# Patient Record
Sex: MALE | Race: WHITE | Employment: OTHER | URBAN - METROPOLITAN AREA
[De-identification: names, ages, dates, MRNs, and addresses within clinical notes are randomized per-mention and may not be internally consistent; named-entity substitution may affect disease eponyms.]

---

## 2017-08-24 RX ORDER — FLUTICASONE PROPIONATE AND SALMETEROL 50; 250 UG/1; UG/1
POWDER RESPIRATORY (INHALATION)
Qty: 1 INHALER | Refills: 5 | Status: SHIPPED | OUTPATIENT
Start: 2017-08-24 | End: 2018-03-26 | Stop reason: ALTCHOICE

## 2018-03-26 ENCOUNTER — HOSPITAL ENCOUNTER (OUTPATIENT)
Dept: LAB | Age: 73
Discharge: HOME | End: 2018-03-26
Payer: MEDICARE

## 2018-03-26 ENCOUNTER — OFFICE VISIT (OUTPATIENT)
Dept: INTERNAL MEDICINE CLINIC | Age: 73
End: 2018-03-26

## 2018-03-26 VITALS
RESPIRATION RATE: 12 BRPM | DIASTOLIC BLOOD PRESSURE: 76 MMHG | WEIGHT: 155 LBS | BODY MASS INDEX: 22.19 KG/M2 | HEIGHT: 70 IN | HEART RATE: 68 BPM | SYSTOLIC BLOOD PRESSURE: 125 MMHG | TEMPERATURE: 98.3 F

## 2018-03-26 DIAGNOSIS — J44.9 CHRONIC OBSTRUCTIVE PULMONARY DISEASE, UNSPECIFIED COPD TYPE (HCC): ICD-10-CM

## 2018-03-26 DIAGNOSIS — Z00.00 MEDICARE ANNUAL WELLNESS VISIT, SUBSEQUENT: Primary | ICD-10-CM

## 2018-03-26 DIAGNOSIS — F17.200 TOBACCO USE DISORDER: ICD-10-CM

## 2018-03-26 DIAGNOSIS — Z12.11 ENCOUNTER FOR SCREENING FECAL OCCULT BLOOD TESTING: ICD-10-CM

## 2018-03-26 DIAGNOSIS — F03.A0 MILD DEMENTIA: ICD-10-CM

## 2018-03-26 DIAGNOSIS — Z71.89 ACP (ADVANCE CARE PLANNING): ICD-10-CM

## 2018-03-26 PROCEDURE — 36415 COLL VENOUS BLD VENIPUNCTURE: CPT

## 2018-03-26 PROCEDURE — 80053 COMPREHEN METABOLIC PANEL: CPT

## 2018-03-26 PROCEDURE — 84153 ASSAY OF PSA TOTAL: CPT

## 2018-03-26 PROCEDURE — 80061 LIPID PANEL: CPT

## 2018-03-26 PROCEDURE — 85027 COMPLETE CBC AUTOMATED: CPT

## 2018-03-26 RX ORDER — MEMANTINE HYDROCHLORIDE AND DONEPEZIL HYDROCHLORIDE 28; 10 MG/1; MG/1
1 CAPSULE ORAL DAILY
Refills: 0 | COMMUNITY
Start: 2018-02-26 | End: 2019-01-01 | Stop reason: ALTCHOICE

## 2018-03-26 RX ORDER — TIMOLOL MALEATE 5 MG/ML
1 SOLUTION/ DROPS OPHTHALMIC DAILY
Refills: 0 | COMMUNITY
Start: 2020-01-01

## 2018-03-26 NOTE — ACP (ADVANCE CARE PLANNING)
Advance Care Planning (ACP) Provider Note - Comprehensive     Date of ACP Conversation: 03/26/18  Persons included in Conversation:  patient  Length of ACP Conversation in minutes:  <16 minutes (Non-Billable)    Authorized Decision Maker (if patient is incapable of making informed decisions): This person is:  Healthcare Agent/Medical Power of  under Advance Directive          General ACP for ALL Patients with Decision Making Capacity:   Importance of advance care planning, including choosing a healthcare agent to communicate patient's healthcare decisions if patient lost the ability to make decisions, such as after a sudden illness or accident  Understanding of the healthcare agent role was assessed and information provided  Exploration of values, goals, and preferences if recovery is not expected, even with continued medical treatment in the event of: Imminent death  Severe, permanent brain injury    Review of Existing Advance Directive:  not availble     For Serious or Chronic Illness:  Understanding of medical condition    Understanding of CPR, goals and expected outcomes, benefits and burdens discussed. Information on CPR success rates provided (e.g. for CPR in hospital, survival to d/c at two weeks is 22%, for chronically ill or elderly/frail survival is less than 3%); Individual asked to communicate understanding of information in his/her own words.   Explored fears and concerns regarding CPR or possible outcomes    Interventions Provided:  Had Advanced Directive at home  Recommended communicating the plan and making copies for the healthcare agent, personal physician, and others as appropriate (e.g., health system)

## 2018-03-26 NOTE — MR AVS SNAPSHOT
[facility] 
 
 
 [address] [phone] Patient: Mindy  MRN: [id number] Visit Information Date & Time Provider Department Dept. Phone Encounter #  
 3/26/2018 10:00 AM Kieran Lemons MD [facility] [address] [id number] Upcoming Health Maintenance Date Due FOBT Q 1 YEAR AGE 50-75 2018* MEDICARE YEARLY EXAM 3/27/2019 GLAUCOMA SCREENING Q2Y 2019 DTaP/Tdap/Td series (2 - Td) 2026 *Topic was postponed. The date shown is not the original due date. Allergies as of 3/26/2018  Review Complete On: 3/26/2018 By: Kieran Lemons MD  
 No Known Allergies Current Immunizations  Reviewed on 3/26/2018 Name Date Pneumococcal Conjugate (PCV-13) 2016 Pneumococcal Polysaccharide (PPSV-23) 10/10/2017 Reviewed by Sofya Staton on 3/26/2018 at 10:07 AM  
 Reviewed by Kieran Lemons MD on 3/26/2018 at 10:23 AM  
You Were Diagnosed With   
  
 Codes Comments Medicare annual wellness visit, subsequent    -  Primary ICD-10-CM: Z00.00 ICD-9-CM: V70.0 ACP (advance care planning)     ICD-10-CM: Z71.89 ICD-9-CM: V65.49 Encounter for screening fecal occult blood testing     ICD-10-CM: Z12.11 ICD-9-CM: V76.51 Chronic obstructive pulmonary disease, unspecified COPD type ([facility]     ICD-10-CM: J44.9 ICD-9-CM: 122 Mild dementia     ICD-10-CM: F03.90 ICD-9-CM: 294.20 Tobacco use disorder     ICD-10-CM: F17.200 ICD-9-CM: 305.1 Vitals BP Pulse Temp Resp Height(growth percentile) Weight(growth percentile) 125/76 (BP 1 Location: Left arm, BP Patient Position: Sitting) 68 98.3 °F (36.8 °C) (Oral) 12 5' 9.5\" (1.765 m) 155 lb (70.3 kg) BMI Smoking Status 22.56 kg/m2 Current Every Day Smoker Vitals History BMI and BSA Data  Body Mass Index Body Surface Area  
 22.56 kg/m 2 1.86 m 2  
  
  
 Preferred Pharmacy Pharm[facility] Name Phone [facility] [address] [phone] Your Updated Medication List  
  
   
This list is accurate as of 3/26/18 10:37 AM.  Always use your most recent med list.  
  
  
  
  
 LUMIGAN 0.01 % ophthalmic drops Generic drug:  bimatoprost  
Administer 1 Drop to both eyes every evening. NAMZARIC 28-10 mg Cspx Generic drug:  memantine-donepezil  
  
 timolol 0.5 % ophthalmic solution Commonly known as:  TIMOPTIC We Performed the Following CBC W/O DIFF [89047 CPT(R)] LIPID PANEL [78869 CPT(R)] METABOLIC PANEL, COMPREHENSIVE [93506 CPT(R)] OCCULT BLOOD, IMMUNOASSAY (FIT) [id number] CPT(R)] PSA W/ REFLX FREE PSA [45518 CPT(R)] Introducing [facility]! Dear Landen Stephen: Thank you for requesting a [email or web address] account. Our records indicate that you have previously registered for a [email or web address] account but its currently inactive. Please call our [email or web address] support line at [phone]. Additional Information If you have questions, please visit the Frequently Asked Questions section of the [email or web address] website at [email or web address] Remember, [email or web address] is NOT to be used for urgent needs. For medical emergencies, dial 911. Now available from your iPhone and Android! Please provide this summary of care documentation to your next provider. Your primary care clinician is listed as Umesh Trejo. If you have any questions after today's visit, please call [phone].

## 2018-03-27 LAB
ALBUMIN SERPL-MCNC: 4.4 G/DL (ref 3.5–4.8)
ALBUMIN/GLOB SERPL: 1.6 {RATIO} (ref 1.2–2.2)
ALP SERPL-CCNC: 118 IU/L (ref 39–117)
ALT SERPL-CCNC: 10 IU/L (ref 0–44)
AST SERPL-CCNC: 17 IU/L (ref 0–40)
BILIRUB SERPL-MCNC: 0.5 MG/DL (ref 0–1.2)
BUN SERPL-MCNC: 14 MG/DL (ref 8–27)
BUN/CREAT SERPL: 16 (ref 10–24)
CALCIUM SERPL-MCNC: 9.8 MG/DL (ref 8.6–10.2)
CHLORIDE SERPL-SCNC: 99 MMOL/L (ref 96–106)
CHOLEST SERPL-MCNC: 186 MG/DL (ref 100–199)
CO2 SERPL-SCNC: 26 MMOL/L (ref 18–29)
CREAT SERPL-MCNC: 0.85 MG/DL (ref 0.76–1.27)
ERYTHROCYTE [DISTWIDTH] IN BLOOD BY AUTOMATED COUNT: 13.5 % (ref 12.3–15.4)
GFR SERPLBLD CREATININE-BSD FMLA CKD-EPI: 100 ML/MIN/1.73
GFR SERPLBLD CREATININE-BSD FMLA CKD-EPI: 86 ML/MIN/1.73
GLOBULIN SER CALC-MCNC: 2.7 G/DL (ref 1.5–4.5)
GLUCOSE SERPL-MCNC: 85 MG/DL (ref 65–99)
HCT VFR BLD AUTO: 41.7 % (ref 37.5–51)
HDLC SERPL-MCNC: 43 MG/DL
HGB BLD-MCNC: 13.7 G/DL (ref 13–17.7)
INTERPRETATION, 910389: NORMAL
LDLC SERPL CALC-MCNC: 125 MG/DL (ref 0–99)
MCH RBC QN AUTO: 33.3 PG (ref 26.6–33)
MCHC RBC AUTO-ENTMCNC: 32.9 G/DL (ref 31.5–35.7)
MCV RBC AUTO: 102 FL (ref 79–97)
PLATELET # BLD AUTO: 330 X10E3/UL (ref 150–379)
POTASSIUM SERPL-SCNC: 4.4 MMOL/L (ref 3.5–5.2)
PROT SERPL-MCNC: 7.1 G/DL (ref 6–8.5)
PSA SERPL-MCNC: 0.6 NG/ML (ref 0–4)
RBC # BLD AUTO: 4.11 X10E6/UL (ref 4.14–5.8)
REFLEX CRITERIA: NORMAL
SODIUM SERPL-SCNC: 140 MMOL/L (ref 134–144)
TRIGL SERPL-MCNC: 91 MG/DL (ref 0–149)
VLDLC SERPL CALC-MCNC: 18 MG/DL (ref 5–40)
WBC # BLD AUTO: 7.6 X10E3/UL (ref 3.4–10.8)
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
## 2018-04-13 ENCOUNTER — HOSPITAL ENCOUNTER (OUTPATIENT)
Dept: LAB | Age: 73
Discharge: HOME | End: 2018-04-13
Payer: MEDICARE

## 2018-04-13 PROCEDURE — 82270 OCCULT BLOOD FECES: CPT

## 2018-04-19 LAB — HEMOCCULT STL QL IA: NEGATIVE

## 2018-07-23 ENCOUNTER — OFFICE VISIT (OUTPATIENT)
Dept: INTERNAL MEDICINE CLINIC | Age: 73
End: 2018-07-23

## 2018-07-23 VITALS
SYSTOLIC BLOOD PRESSURE: 135 MMHG | OXYGEN SATURATION: 96 % | DIASTOLIC BLOOD PRESSURE: 73 MMHG | TEMPERATURE: 97.8 F | BODY MASS INDEX: 21.92 KG/M2 | RESPIRATION RATE: 18 BRPM | HEIGHT: 69 IN | WEIGHT: 148 LBS | HEART RATE: 55 BPM

## 2018-07-23 DIAGNOSIS — R55 PRE-SYNCOPE: Primary | ICD-10-CM
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
## 2018-07-23 NOTE — MR AVS SNAPSHOT
[facility] 
 
 
 [address] [place] [address] [facility] 
[phone] Patient: Tiffany Farris MRN: [id number] Visit Information Date & Time Provider Department Dept. Phone Encounter #  
 7/23/2018 10:30 AM Christina Newsome MD [facility] [address] [facility] [id number] Upcoming Health Maintenance Date Due Influenza Age 5 to Adult 8/1/2018 MEDICARE YEARLY EXAM 3/27/2019 FOBT Q 1 YEAR AGE 50-75 4/13/2019 GLAUCOMA SCREENING Q2Y 4/16/2020 DTaP/Tdap/Td series (2 - Td) 5/12/2026 Allergies as of 7/23/2018  Review Complete On: 7/23/2018 By: Christina Newsome MD  
 No Known Allergies Current Immunizations  Reviewed on 3/26/2018 Name Date Pneumococcal Conjugate (PCV-13) 8/2/2016 Pneumococcal Polysaccharide (PPSV-23) 10/10/2017 Not reviewed this visit You Were Diagnosed With   
  
 Codes Comments Pre-syncope    -  Primary ICD-10-CM: R55 
ICD-9-CM: 067. 2 Vitals BP Pulse Temp Resp Height(growth percentile) Weight(growth percentile) 135/73 (BP 1 Location: Left arm, BP Patient Position: Sitting) (!) 55 97.8 °F (36.6 °C) (Oral) 18 5' 9\" (1.753 m) 148 lb (67.1 kg) SpO2 BMI Smoking Status 96% 21.86 kg/m2 Current Every Day Smoker Vitals History BMI and BSA Data Body Mass Index Body Surface Area  
 21.86 kg/m 2 1.81 m 2 Preferred Pharmacy Pharmacy Name Phone [facility] [address] [facility] [phone] Your Updated Medication List  
  
   
This list is accurate as of 7/23/18 11:21 AM.  Always use your most recent med list.  
  
  
  
  
 LUMIGAN 0.01 % ophthalmic drops Generic drug:  bimatoprost  
Administer 1 Drop to right eye every evening. MULTIVITAMIN & MINERAL FORMULA PO Take 1 Tab by mouth daily. NAMZARIC 28-10 mg Cspx Generic drug:  memantine-donepezil Take 1 Tab by mouth daily. timolol 0.5 % ophthalmic solution Commonly known as:  TIMOPTIC Administer 1 Drop to right eye daily. Introducing [facility]! Dear Shannon Alvarado: Thank you for requesting a [employer] account. Our records indicate that you have previously registered for a [employer] account but its currently inactive. Please call our [employer] support line at [phone]. Additional Information If you have questions, please visit the Frequently Asked Questions section of the [employer] website at [email or web address] Remember, [employer] is NOT to be used for urgent needs. For medical emergencies, dial 911. Now available from your iPhone and Android! Please provide this summary of care documentation to your next provider. Your primary care clinician is listed as Collie Link. If you have any questions after today's visit, please call [phone].

## 2019-01-01 ENCOUNTER — HOSPITAL ENCOUNTER (OUTPATIENT)
Dept: LAB | Age: 74
Discharge: HOME | End: 2019-11-11

## 2019-01-01 ENCOUNTER — OFFICE VISIT (OUTPATIENT)
Dept: INTERNAL MEDICINE CLINIC | Age: 74
End: 2019-01-01

## 2019-01-01 VITALS
BODY MASS INDEX: 21.42 KG/M2 | TEMPERATURE: 97.6 F | WEIGHT: 144 LBS | RESPIRATION RATE: 18 BRPM | HEART RATE: 63 BPM | SYSTOLIC BLOOD PRESSURE: 113 MMHG | HEART RATE: 92 BPM | DIASTOLIC BLOOD PRESSURE: 73 MMHG | SYSTOLIC BLOOD PRESSURE: 122 MMHG | BODY MASS INDEX: 21.33 KG/M2 | TEMPERATURE: 97.8 F | OXYGEN SATURATION: 95 % | HEIGHT: 69 IN | DIASTOLIC BLOOD PRESSURE: 73 MMHG | RESPIRATION RATE: 12 BRPM | OXYGEN SATURATION: 97 % | HEIGHT: 69 IN | WEIGHT: 144.6 LBS

## 2019-01-01 DIAGNOSIS — Z00.00 MEDICARE ANNUAL WELLNESS VISIT, SUBSEQUENT: Primary | ICD-10-CM

## 2019-01-01 DIAGNOSIS — H61.23 BILATERAL HEARING LOSS DUE TO CERUMEN IMPACTION: ICD-10-CM

## 2019-01-01 DIAGNOSIS — J44.9 CHRONIC OBSTRUCTIVE PULMONARY DISEASE, UNSPECIFIED COPD TYPE (HCC): ICD-10-CM

## 2019-01-01 DIAGNOSIS — Z28.21 INFLUENZA VACCINATION DECLINED: ICD-10-CM

## 2019-01-01 DIAGNOSIS — F03.A0 MILD DEMENTIA: ICD-10-CM

## 2019-01-01 DIAGNOSIS — Z00.00 MEDICARE ANNUAL WELLNESS VISIT, SUBSEQUENT: ICD-10-CM

## 2019-01-01 DIAGNOSIS — H61.23 BILATERAL HEARING LOSS DUE TO CERUMEN IMPACTION: Primary | ICD-10-CM

## 2019-01-01 LAB
ALBUMIN SERPL-MCNC: 4 G/DL (ref 3.5–5)
ALBUMIN/GLOB SERPL: 1.3 {RATIO} (ref 1.1–2.2)
ALP SERPL-CCNC: 142 U/L (ref 45–117)
ALT SERPL-CCNC: 15 U/L (ref 12–78)
ANION GAP SERPL CALC-SCNC: 6 MMOL/L (ref 5–15)
AST SERPL-CCNC: 11 U/L (ref 15–37)
BILIRUB SERPL-MCNC: 0.3 MG/DL (ref 0.2–1)
BUN SERPL-MCNC: 11 MG/DL (ref 6–20)
BUN/CREAT SERPL: 13 (ref 12–20)
CALCIUM SERPL-MCNC: 9.4 MG/DL (ref 8.5–10.1)
CHLORIDE SERPL-SCNC: 103 MMOL/L (ref 97–108)
CHOLEST SERPL-MCNC: 189 MG/DL
CO2 SERPL-SCNC: 29 MMOL/L (ref 21–32)
CREAT SERPL-MCNC: 0.87 MG/DL (ref 0.7–1.3)
ERYTHROCYTE [DISTWIDTH] IN BLOOD BY AUTOMATED COUNT: 13.1 % (ref 11.5–14.5)
GLOBULIN SER CALC-MCNC: 3 G/DL (ref 2–4)
GLUCOSE SERPL-MCNC: 99 MG/DL (ref 65–100)
HCT VFR BLD AUTO: 40.1 % (ref 36.6–50.3)
HDLC SERPL-MCNC: 41 MG/DL
HDLC SERPL: 4.6 {RATIO} (ref 0–5)
HGB BLD-MCNC: 13.2 G/DL (ref 12.1–17)
LDLC SERPL CALC-MCNC: 130.8 MG/DL (ref 0–100)
LIPID PROFILE,FLP: ABNORMAL
MCH RBC QN AUTO: 33.9 PG (ref 26–34)
MCHC RBC AUTO-ENTMCNC: 32.9 G/DL (ref 30–36.5)
MCV RBC AUTO: 103.1 FL (ref 80–99)
NRBC # BLD: 0 K/UL (ref 0–0.01)
NRBC BLD-RTO: 0 PER 100 WBC
PLATELET # BLD AUTO: 291 K/UL (ref 150–400)
PMV BLD AUTO: 10 FL (ref 8.9–12.9)
POTASSIUM SERPL-SCNC: 3.7 MMOL/L (ref 3.5–5.1)
PROT SERPL-MCNC: 7 G/DL (ref 6.4–8.2)
RBC # BLD AUTO: 3.89 M/UL (ref 4.1–5.7)
SODIUM SERPL-SCNC: 138 MMOL/L (ref 136–145)
TRIGL SERPL-MCNC: 86 MG/DL (ref ?–150)
VLDLC SERPL CALC-MCNC: 17.2 MG/DL
WBC # BLD AUTO: 10.1 K/UL (ref 4.1–11.1)

## 2019-01-01 RX ORDER — MEMANTINE HYDROCHLORIDE 10 MG/1
10 TABLET ORAL 2 TIMES DAILY
Qty: 60 TAB | Refills: 5 | Status: SHIPPED | OUTPATIENT
Start: 2019-01-01 | End: 2019-01-01 | Stop reason: ALTCHOICE

## 2019-01-01 RX ORDER — DONEPEZIL HYDROCHLORIDE 10 MG/1
10 TABLET, FILM COATED ORAL
Qty: 30 TAB | Refills: 5 | Status: SHIPPED | OUTPATIENT
Start: 2019-01-01 | End: 2019-01-01 | Stop reason: ALTCHOICE

## 2019-01-01 RX ORDER — MEMANTINE HYDROCHLORIDE AND DONEPEZIL HYDROCHLORIDE 28; 10 MG/1; MG/1
1 CAPSULE ORAL DAILY
Qty: 30 CAP | Refills: 5 | Status: SHIPPED | OUTPATIENT
Start: 2019-01-01

## 2019-01-01 RX ORDER — MEMANTINE HYDROCHLORIDE AND DONEPEZIL HYDROCHLORIDE 28; 10 MG/1; MG/1
1 CAPSULE ORAL DAILY
Qty: 30 CAP | Refills: 5
Start: 2019-01-01 | End: 2019-01-01 | Stop reason: SDUPTHER

## 2019-11-11 NOTE — ACP (ADVANCE CARE PLANNING)
Advance Care Planning (ACP) Note Date of ACP Conversation: 11/11/2019 Persons included in Conversation: patient Length of ACP Conversation in minutes: <16 minutes (Non-Billable) Conversation requested by: 
 Patient Authorized Decision Maker (if patient is incapable of making informed decisions): This person is: 
Healthcare Agent/Medical Power of  under Advance Directive General ACP for ALL Patients with Decision Making Capacity: yes Advance Directive Conversation with Patients who have not yet planned: 
Importance of advance care planning, including choosing a healthcare agent to communicate patient's healthcare decisions if patient lost the ability to make decisions, such as after a sudden illness or accident Explored patient's values, goals, and care preferences as related to these situations Review of Existing Advance Directive: (Select questions covered) Does this advance directive still reflect your preferences? Yes Interventions Provided: 
Recommended review of completed ACP document annually or upon change in health status

## 2019-11-11 NOTE — PATIENT INSTRUCTIONS
Well Visit, Over 72: Care Instructions Your Care Instructions Physical exams can help you stay healthy. Your doctor has checked your overall health and may have suggested ways to take good care of yourself. He or she also may have recommended tests. At home, you can help prevent illness with healthy eating, regular exercise, and other steps. Follow-up care is a key part of your treatment and safety. Be sure to make and go to all appointments, and call your doctor if you are having problems. It's also a good idea to know your test results and keep a list of the medicines you take. How can you care for yourself at home? · Reach and stay at a healthy weight. This will lower your risk for many problems, such as obesity, diabetes, heart disease, and high blood pressure. · Get at least 30 minutes of exercise on most days of the week. Walking is a good choice. You also may want to do other activities, such as running, swimming, cycling, or playing tennis or team sports. · Do not smoke. Smoking can make health problems worse. If you need help quitting, talk to your doctor about stop-smoking programs and medicines. These can increase your chances of quitting for good. · Protect your skin from too much sun. When you're outdoors from 10 a.m. to 4 p.m., stay in the shade or cover up with clothing and a hat with a wide brim. Wear sunglasses that block UV rays. Even when it's cloudy, put broad-spectrum sunscreen (SPF 30 or higher) on any exposed skin. · See a dentist one or two times a year for checkups and to have your teeth cleaned. · Wear a seat belt in the car. Follow your doctor's advice about when to have certain tests. These tests can spot problems early. For men and women · Cholesterol. Your doctor will tell you how often to have this done based on your overall health and other things that can increase your risk for heart attack and stroke. · Blood pressure. Have your blood pressure checked during a routine doctor visit. Your doctor will tell you how often to check your blood pressure based on your age, your blood pressure results, and other factors. · Diabetes. Ask your doctor whether you should have tests for diabetes. · Vision. Experts recommend that you have yearly exams for glaucoma and other age-related eye problems. · Hearing. Tell your doctor if you notice any change in your hearing. You can have tests to find out how well you hear. · Colon cancer tests. Keep having colon cancer tests as your doctor recommends. You can have one of several types of tests. · Heart attack and stroke risk. At least every 4 to 6 years, you should have your risk for heart attack and stroke assessed. Your doctor uses factors such as your age, blood pressure, cholesterol, and whether you smoke or have diabetes to show what your risk for a heart attack or stroke is over the next 10 years. · Osteoporosis. Talk to your doctor about whether you should have a bone density test to find out whether you have thinning bones. Also ask your doctor about whether you should take calcium and vitamin D supplements. For women · Pap test and pelvic exam. You may no longer need a Pap test. Talk with your doctor about whether to stop or continue to have Pap tests. · Breast exam and mammogram. Ask how often you should have a mammogram, which is an X-ray of your breasts. A mammogram can spot breast cancer before it can be felt and when it is easiest to treat. · Thyroid disease. Talk to your doctor about whether to have your thyroid checked as part of a regular physical exam. Women have an increased chance of a thyroid problem. For men · Prostate exam. Talk to your doctor about whether you should have a blood test (called a PSA test) for prostate cancer.  Experts recommend that you discuss the benefits and risks of the test with your doctor before you decide whether to have this test. Some experts say that men ages 79 and older no longer need testing. · Abdominal aortic aneurysm. Ask your doctor whether you should have a test to check for an aneurysm. You may need a test if you ever smoked or if your parent, brother, sister, or child has had an aneurysm. When should you call for help? Watch closely for changes in your health, and be sure to contact your doctor if you have any problems or symptoms that concern you. Where can you learn more? Go to http://gee-nigel.info/. Enter Z025 in the search box to learn more about \"Well Visit, Over 65: Care Instructions. \" Current as of: December 13, 2018 Content Version: 12.2 © 8452-6856 [employer]. Care instructions adapted under license by [employer] (which disclaims liability or warranty for this information). If you have questions about a medical condition or this instruction, always ask your healthcare professional. [place] [address] any warranty or liability for your use of this information.
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
## 2020-01-01 ENCOUNTER — HOME CARE VISIT (OUTPATIENT)
Dept: HOSPICE | Facility: HOSPICE | Age: 75
End: 2020-01-01
Payer: MEDICARE

## 2020-01-01 ENCOUNTER — VIRTUAL VISIT (OUTPATIENT)
Dept: ONCOLOGY | Age: 75
End: 2020-01-01

## 2020-01-01 ENCOUNTER — HOME CARE VISIT (OUTPATIENT)
Dept: SCHEDULING | Facility: HOME HEALTH | Age: 75
End: 2020-01-01
Payer: MEDICARE

## 2020-01-01 ENCOUNTER — HOSPITAL ENCOUNTER (OUTPATIENT)
Dept: LAB | Age: 75
Discharge: HOME | End: 2020-04-09

## 2020-01-01 ENCOUNTER — TELEPHONE (OUTPATIENT)
Dept: INTERNAL MEDICINE CLINIC | Age: 75
End: 2020-01-01

## 2020-01-01 ENCOUNTER — HOSPITAL ENCOUNTER (OUTPATIENT)
Dept: LAB | Age: 75
Discharge: HOME | End: 2020-03-26

## 2020-01-01 ENCOUNTER — TELEPHONE (OUTPATIENT)
Dept: ONCOLOGY | Age: 75
End: 2020-01-01

## 2020-01-01 ENCOUNTER — OFFICE VISIT (OUTPATIENT)
Dept: INTERNAL MEDICINE CLINIC | Age: 75
End: 2020-01-01

## 2020-01-01 ENCOUNTER — HOSPITAL ENCOUNTER (OUTPATIENT)
Dept: GENERAL RADIOLOGY | Age: 75
Discharge: HOME | End: 2020-04-09
Attending: INTERNAL MEDICINE
Payer: MEDICARE

## 2020-01-01 ENCOUNTER — HOSPITAL ENCOUNTER (OUTPATIENT)
Dept: CT IMAGING | Age: 75
Discharge: HOME | End: 2020-04-09
Attending: INTERNAL MEDICINE
Payer: MEDICARE

## 2020-01-01 ENCOUNTER — HOSPITAL ENCOUNTER (OUTPATIENT)
Dept: CT IMAGING | Age: 75
Discharge: HOME | End: 2020-04-14
Attending: INTERNAL MEDICINE
Payer: MEDICARE

## 2020-01-01 ENCOUNTER — HOSPICE ADMISSION (OUTPATIENT)
Dept: HOSPICE | Facility: HOSPICE | Age: 75
End: 2020-01-01
Payer: MEDICARE

## 2020-01-01 VITALS
SYSTOLIC BLOOD PRESSURE: 130 MMHG | DIASTOLIC BLOOD PRESSURE: 64 MMHG | TEMPERATURE: 97.4 F | OXYGEN SATURATION: 90 % | TEMPERATURE: 97.7 F | RESPIRATION RATE: 24 BRPM | HEART RATE: 92 BPM | SYSTOLIC BLOOD PRESSURE: 92 MMHG | OXYGEN SATURATION: 88 % | HEART RATE: 128 BPM | SYSTOLIC BLOOD PRESSURE: 106 MMHG | RESPIRATION RATE: 40 BRPM | HEART RATE: 118 BPM | DIASTOLIC BLOOD PRESSURE: 64 MMHG | DIASTOLIC BLOOD PRESSURE: 62 MMHG | OXYGEN SATURATION: 82 % | RESPIRATION RATE: 40 BRPM | TEMPERATURE: 97.8 F

## 2020-01-01 VITALS
DIASTOLIC BLOOD PRESSURE: 75 MMHG | OXYGEN SATURATION: 93 % | HEART RATE: 110 BPM | BODY MASS INDEX: 20.88 KG/M2 | RESPIRATION RATE: 18 BRPM | HEIGHT: 67 IN | WEIGHT: 133 LBS | SYSTOLIC BLOOD PRESSURE: 115 MMHG

## 2020-01-01 VITALS
WEIGHT: 133.8 LBS | TEMPERATURE: 96 F | RESPIRATION RATE: 16 BRPM | HEART RATE: 84 BPM | OXYGEN SATURATION: 94 % | SYSTOLIC BLOOD PRESSURE: 125 MMHG | HEIGHT: 69 IN | DIASTOLIC BLOOD PRESSURE: 78 MMHG | BODY MASS INDEX: 19.82 KG/M2

## 2020-01-01 VITALS — DIASTOLIC BLOOD PRESSURE: 62 MMHG | SYSTOLIC BLOOD PRESSURE: 88 MMHG | RESPIRATION RATE: 36 BRPM

## 2020-01-01 VITALS
OXYGEN SATURATION: 94 % | HEART RATE: 100 BPM | BODY MASS INDEX: 20.51 KG/M2 | RESPIRATION RATE: 16 BRPM | DIASTOLIC BLOOD PRESSURE: 76 MMHG | HEIGHT: 69 IN | SYSTOLIC BLOOD PRESSURE: 113 MMHG | WEIGHT: 138.5 LBS | TEMPERATURE: 97.3 F

## 2020-01-01 VITALS — HEART RATE: 110 BPM | RESPIRATION RATE: 20 BRPM | OXYGEN SATURATION: 88 %

## 2020-01-01 DIAGNOSIS — Z12.5 SCREENING PSA (PROSTATE SPECIFIC ANTIGEN): ICD-10-CM

## 2020-01-01 DIAGNOSIS — J90 PLEURAL EFFUSION ON RIGHT: ICD-10-CM

## 2020-01-01 DIAGNOSIS — M54.50 ACUTE RIGHT-SIDED LOW BACK PAIN WITHOUT SCIATICA: ICD-10-CM

## 2020-01-01 DIAGNOSIS — R82.998 LEUKOCYTES IN URINE: ICD-10-CM

## 2020-01-01 DIAGNOSIS — R63.4 WEIGHT LOSS: Primary | ICD-10-CM

## 2020-01-01 DIAGNOSIS — R07.89 RIGHT-SIDED CHEST WALL PAIN: ICD-10-CM

## 2020-01-01 DIAGNOSIS — C80.1 CANCER (HCC): Primary | ICD-10-CM

## 2020-01-01 DIAGNOSIS — I49.9 CARDIAC ARRHYTHMIA, UNSPECIFIED CARDIAC ARRHYTHMIA TYPE: ICD-10-CM

## 2020-01-01 DIAGNOSIS — R91.8 LUNG MASS: ICD-10-CM

## 2020-01-01 DIAGNOSIS — R63.0 DECREASED APPETITE: ICD-10-CM

## 2020-01-01 DIAGNOSIS — C78.00 MALIGNANT NEOPLASM METASTATIC TO LUNG, UNSPECIFIED LATERALITY (HCC): ICD-10-CM

## 2020-01-01 DIAGNOSIS — F03.B0 MODERATE DEMENTIA WITHOUT BEHAVIORAL DISTURBANCE: ICD-10-CM

## 2020-01-01 DIAGNOSIS — J44.9 CHRONIC OBSTRUCTIVE PULMONARY DISEASE, UNSPECIFIED COPD TYPE (HCC): ICD-10-CM

## 2020-01-01 DIAGNOSIS — R16.0 LIVER MASS: ICD-10-CM

## 2020-01-01 DIAGNOSIS — M54.50 ACUTE RIGHT-SIDED LOW BACK PAIN WITHOUT SCIATICA: Primary | ICD-10-CM

## 2020-01-01 DIAGNOSIS — C78.7 METASTASIS TO LIVER (HCC): ICD-10-CM

## 2020-01-01 DIAGNOSIS — J90 PLEURAL EFFUSION: ICD-10-CM

## 2020-01-01 DIAGNOSIS — I49.1 PAC (PREMATURE ATRIAL CONTRACTION): ICD-10-CM

## 2020-01-01 DIAGNOSIS — J90 PLEURAL EFFUSION: Primary | ICD-10-CM

## 2020-01-01 DIAGNOSIS — N13.30 HYDRONEPHROSIS, UNSPECIFIED HYDRONEPHROSIS TYPE: ICD-10-CM

## 2020-01-01 DIAGNOSIS — C80.1 CANCER (HCC): ICD-10-CM

## 2020-01-01 DIAGNOSIS — R63.4 WEIGHT LOSS: ICD-10-CM

## 2020-01-01 DIAGNOSIS — C34.91 MALIGNANT NEOPLASM OF RIGHT LUNG, UNSPECIFIED PART OF LUNG (HCC): Primary | ICD-10-CM

## 2020-01-01 LAB
ALBUMIN SERPL ELPH-MCNC: 3.3 G/DL (ref 2.9–4.4)
ALBUMIN SERPL-MCNC: 3.4 G/DL (ref 3.5–5)
ALBUMIN/GLOB SERPL: 0.9 {RATIO} (ref 0.7–1.7)
ALBUMIN/GLOB SERPL: 0.9 {RATIO} (ref 1.1–2.2)
ALP SERPL-CCNC: 156 U/L (ref 45–117)
ALPHA1 GLOB SERPL ELPH-MCNC: 0.4 G/DL (ref 0–0.4)
ALPHA2 GLOB SERPL ELPH-MCNC: 1 G/DL (ref 0.4–1)
ALT SERPL-CCNC: 46 U/L (ref 12–78)
ANION GAP SERPL CALC-SCNC: 13 MMOL/L (ref 5–15)
AST SERPL-CCNC: 63 U/L (ref 15–37)
B-GLOBULIN SERPL ELPH-MCNC: 1.4 G/DL (ref 0.7–1.3)
BACTERIA SPEC CULT: NORMAL
BASOPHILS # BLD: 0 K/UL (ref 0–0.1)
BASOPHILS NFR BLD: 0 % (ref 0–1)
BILIRUB SERPL-MCNC: 0.6 MG/DL (ref 0.2–1)
BILIRUB UR QL STRIP: ABNORMAL
BUN SERPL-MCNC: 14 MG/DL (ref 6–20)
BUN/CREAT SERPL: 13 (ref 12–20)
CALCIUM SERPL-MCNC: 10 MG/DL (ref 8.5–10.1)
CHLORIDE SERPL-SCNC: 95 MMOL/L (ref 97–108)
CO2 SERPL-SCNC: 27 MMOL/L (ref 21–32)
CREAT SERPL-MCNC: 1.08 MG/DL (ref 0.7–1.3)
DIFFERENTIAL METHOD BLD: ABNORMAL
EOSINOPHIL # BLD: 0 K/UL (ref 0–0.4)
EOSINOPHIL NFR BLD: 0 % (ref 0–7)
ERYTHROCYTE [DISTWIDTH] IN BLOOD BY AUTOMATED COUNT: 12.8 % (ref 11.5–14.5)
GAMMA GLOB SERPL ELPH-MCNC: 0.7 G/DL (ref 0.4–1.8)
GLOBULIN SER CALC-MCNC: 3.5 G/DL (ref 2.2–3.9)
GLOBULIN SER CALC-MCNC: 3.8 G/DL (ref 2–4)
GLUCOSE SERPL-MCNC: 127 MG/DL (ref 65–100)
GLUCOSE UR-MCNC: NEGATIVE MG/DL
HCT VFR BLD AUTO: 41.7 % (ref 36.6–50.3)
HGB BLD-MCNC: 14.1 G/DL (ref 12.1–17)
IMM GRANULOCYTES # BLD AUTO: 0.1 K/UL (ref 0–0.04)
IMM GRANULOCYTES NFR BLD AUTO: 1 % (ref 0–0.5)
KETONES P FAST UR STRIP-MCNC: ABNORMAL MG/DL
LYMPHOCYTES # BLD: 1.2 K/UL (ref 0.8–3.5)
LYMPHOCYTES NFR BLD: 9 % (ref 12–49)
M PROTEIN SERPL ELPH-MCNC: ABNORMAL G/DL
MCH RBC QN AUTO: 33.3 PG (ref 26–34)
MCHC RBC AUTO-ENTMCNC: 33.8 G/DL (ref 30–36.5)
MCV RBC AUTO: 98.6 FL (ref 80–99)
MONOCYTES # BLD: 1.2 K/UL (ref 0–1)
MONOCYTES NFR BLD: 10 % (ref 5–13)
NEUTS SEG # BLD: 9.8 K/UL (ref 1.8–8)
NEUTS SEG NFR BLD: 80 % (ref 32–75)
NRBC # BLD: 0 K/UL (ref 0–0.01)
NRBC BLD-RTO: 0 PER 100 WBC
PH UR STRIP: 6 [PH] (ref 4.6–8)
PLATELET # BLD AUTO: 269 K/UL (ref 150–400)
PMV BLD AUTO: 10.7 FL (ref 8.9–12.9)
POTASSIUM SERPL-SCNC: 2.9 MMOL/L (ref 3.5–5.1)
PROT SERPL-MCNC: 6.8 G/DL (ref 6–8.5)
PROT SERPL-MCNC: 7.2 G/DL (ref 6.4–8.2)
PROT UR QL STRIP: ABNORMAL
PSA SERPL-MCNC: 0.6 NG/ML (ref 0–4)
RBC # BLD AUTO: 4.23 M/UL (ref 4.1–5.7)
REFLEX CRITERIA: NORMAL
SERVICE CMNT-IMP: NORMAL
SODIUM SERPL-SCNC: 135 MMOL/L (ref 136–145)
SP GR UR STRIP: 1.02 (ref 1–1.03)
T4 FREE SERPL-MCNC: 1.5 NG/DL (ref 0.8–1.5)
TSH SERPL DL<=0.05 MIU/L-ACNC: 2.01 UIU/ML (ref 0.36–3.74)
UA UROBILINOGEN AMB POC: ABNORMAL (ref 0.2–1)
URINALYSIS CLARITY POC: CLEAR
URINALYSIS COLOR POC: YELLOW
URINE BLOOD POC: NEGATIVE
URINE LEUKOCYTES POC: ABNORMAL
URINE NITRITES POC: NEGATIVE
WBC # BLD AUTO: 12.3 K/UL (ref 4.1–11.1)

## 2020-01-01 PROCEDURE — 74177 CT ABD & PELVIS W/CONTRAST: CPT

## 2020-01-01 PROCEDURE — 0651 HSPC ROUTINE HOME CARE

## 2020-01-01 PROCEDURE — HOSPICE MEDICATION HC HH HOSPICE MEDICATION

## 2020-01-01 PROCEDURE — G0299 HHS/HOSPICE OF RN EA 15 MIN: HCPCS

## 2020-01-01 PROCEDURE — A9270 NON-COVERED ITEM OR SERVICE: HCPCS

## 2020-01-01 PROCEDURE — T4526 ADULT SIZE PULL-ON MED: HCPCS

## 2020-01-01 PROCEDURE — 74011636320 HC RX REV CODE- 636/320: Performed by: INTERNAL MEDICINE

## 2020-01-01 PROCEDURE — 3336500001 HSPC ELECTION

## 2020-01-01 PROCEDURE — T4541 LARGE DISPOSABLE UNDERPAD: HCPCS

## 2020-01-01 PROCEDURE — 71101 X-RAY EXAM UNILAT RIBS/CHEST: CPT

## 2020-01-01 PROCEDURE — HHS10554 SHAMPOO/BODY WASH 8 OZ ALOE VESTA

## 2020-01-01 PROCEDURE — A6250 SKIN SEAL PROTECT MOISTURIZR: HCPCS

## 2020-01-01 PROCEDURE — 71250 CT THORAX DX C-: CPT

## 2020-01-01 PROCEDURE — 3331090004 HSPC SERVICE INTENSITY ADD-ON

## 2020-01-01 RX ORDER — IBUPROFEN 200 MG
600 TABLET ORAL
Qty: 60 TAB | Refills: 2
Start: 2020-01-01

## 2020-01-01 RX ORDER — MIRTAZAPINE 7.5 MG/1
7.5 TABLET, FILM COATED ORAL
Qty: 30 TAB | Refills: 0 | Status: SHIPPED | OUTPATIENT
Start: 2020-01-01

## 2020-01-01 RX ADMIN — LORAZEPAM 0.5 MG: 0.5 TABLET ORAL at 14:00

## 2020-01-01 RX ADMIN — MORPHINE SULFATE 5 MG: 20 SOLUTION ORAL at 10:15

## 2020-01-01 RX ADMIN — IOPAMIDOL 100 ML: 755 INJECTION, SOLUTION INTRAVENOUS at 09:25

## 2020-01-01 RX ADMIN — MORPHINE SULFATE 10 MG: 20 SOLUTION ORAL at 06:18

## 2020-01-01 RX ADMIN — MORPHINE SULFATE 5 MG: 20 SOLUTION ORAL at 11:15
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
## 2020-03-26 NOTE — PATIENT INSTRUCTIONS

## 2020-04-06 NOTE — TELEPHONE ENCOUNTER
----- Message from Ariana Standard sent at 4/6/2020  8:33 AM EDT -----  Regarding: Dr. Marilia Monroe Message/Vendor Calls    Caller's first and last name:Bekah Mosquera Wife      Reason for call: not getting any better      Callback required yes/no and why:yes      Best contact number(s):  [phone]    Details to clarify the request: Patient's wife would like to discuss her husbands health      Ariana Standard

## 2020-04-07 NOTE — TELEPHONE ENCOUNTER
----- Message from Kelsie Lee sent at 4/7/2020  8:11 AM EDT -----  Regarding: Mary/Kayla  Patient return call    Caller's first and last name and relationship (if not the patient): Leslie Man contact number(s):[phone]      Whose call is being returned:Mary      Details to clarify the request: Patient wife has health concerns about her       Kelsie Rosa

## 2020-04-08 NOTE — TELEPHONE ENCOUNTER
Called back several times, wife states her phone was not working and messages came much later , wife states his appetite is nothing , fatigue , wants to sleep, just lay in bed , advised Ensure or Boost, she is getting this and can do Tele. visit , has no way to do any other visit.  Wife thanks maybe he needs labs

## 2020-04-08 NOTE — TELEPHONE ENCOUNTER
----- Message from Sergei Oseguera sent at 2020 10:31 AM EDT -----  Regarding: Dr. Miguel Luo: [phone]  Caller's first and last name and relationship to patient (if not the patient): Lawson Sarkar  Best contact number:[phone]  Preferred date and time: anytime  Scheduled appointment date and time: Available  Reason for appointment: Spouse reports Pt has not been eating , very fatigue and complains of back pain. Details to clarify the request: Spouse stated she has called before and has not received a call back. Spouse is requesting blood work.
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
## 2020-04-10 NOTE — TELEPHONE ENCOUNTER
Results reviewed of labs from yesterday and Chest CT without contrast.      Labs show mildly increasing alk phos 156, mildly elevated WBC 12.3, normal thyroid, normal PSA, normal SPEP. Hypokalemia. CT shows extensive masses. Mediastinal lymphadenopathy of multiple nodes, large right pleural effusion with multiple right-sided pleural masses measuring up to 4.6 x 2.3 cm. Dense right lobe consolidation, moderately severe right-sided hydronephrosis, 5 mm right calculus, multiple enlarged retroperitoneal nodes measuring up to 4.6 cm. Multiple low-attenuation liver lesions measuring up to 2.0 x 1.8 cm. Old rib fractures. No other bone lesions. Tiny Cholelithiasis. I have spoke with pulmonary Dr. John Pereira. She reviewed CT. Suspects pain is from pleural mets and trapped lung. Risk of complications with throacentesis and may not help pain, but can consider for symptomatic or diagnostic dx. Liver met may be amenable to biopsy. Try to avoid bronch for now due tp COVID. I will contact her after further work-up. I have reached out to oncology Dr. Dajuan Vanessa who recommended a chest, abdomen, pelvis CT with contrast to further evaluate his masses. I have ordered this. Have spoken with his wife now. Patient history is poor because of dementia. He is eating ensure and drinking. Starting bananas for low K. He is not SOB, no fever, no pain when lying down. His wife and he are going to discuss goals of care. He has not especially been interested in aggressive medical care in the past.  Discussed that prognosis of this fairly extensive cancer is poor. Discussed how it likely will progress and cause obstructive symptoms either of his liver, lung or kidney or possibly pancreas which could cause acute symptoms of pain and possibly infection. She agrees that he should have the CT scan above to further delineate his overall prognosis and the extent of disease.   I have given her the number the  and we can help Monday if they dont call. I will review results of this scan with his wife on Monday after it is done and will refer to oncology for further discussions if they are willing to proceed. I suspect they will pursue comfort care. Primary issue now is maintaining nutritional status. She will give him extra protein as much as possible. He is not in any acute distress to need any pain medications at this point.

## 2020-04-10 NOTE — TELEPHONE ENCOUNTER
Patient's wife is calling for patient's imaging results , she can be reached at [phone] or on cell [phone]
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
## 2020-04-21 NOTE — TELEPHONE ENCOUNTER
[employer]  Social Work Navigator Encounter     Patient Name:  Frank Salmeron    Medical History: metastatic cancer    Advance Directives: on file    Narrative: Supportive call placed to patient's wife, Georges Rodriguez to introduce social work role and supports. Patient has enrolled in [facility]. Ms. Jaden Mercer tells me that are taking it a day at a time but are well supported with practical and emotional needs by family. She has a daughter and two sisters who live locally. She has spoke with the hospice social work. She declines any needs at this time. Will update Dr. Frank Pantoja patient is enrolled in hospice. Barriers to Care: none identified at this time    Assessment/Action:  1.  Patient enrolled in [facility]     Plan/Referral:     Thank you,  Arely Suarez LCSW

## 2020-04-28 ENCOUNTER — HOME CARE VISIT (OUTPATIENT)
Dept: HOSPICE | Facility: HOSPICE | Age: 75
End: 2020-04-28
Payer: MEDICARE
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
## 2024-01-26 NOTE — TELEPHONE ENCOUNTER
We are going to do labs, we might as well see him in the office. I am concerned about him and his vital signs need to be checked. DISCHARGE